# Patient Record
Sex: FEMALE | Race: WHITE | NOT HISPANIC OR LATINO | Employment: PART TIME | URBAN - METROPOLITAN AREA
[De-identification: names, ages, dates, MRNs, and addresses within clinical notes are randomized per-mention and may not be internally consistent; named-entity substitution may affect disease eponyms.]

---

## 2017-05-20 ENCOUNTER — ALLSCRIPTS OFFICE VISIT (OUTPATIENT)
Dept: OTHER | Facility: OTHER | Age: 22
End: 2017-05-20

## 2017-05-23 ENCOUNTER — GENERIC CONVERSION - ENCOUNTER (OUTPATIENT)
Dept: OTHER | Facility: OTHER | Age: 22
End: 2017-05-23

## 2017-05-23 LAB — CULTURE RESULT (HISTORICAL): NORMAL

## 2018-01-12 VITALS
BODY MASS INDEX: 20.14 KG/M2 | DIASTOLIC BLOOD PRESSURE: 60 MMHG | TEMPERATURE: 102 F | HEIGHT: 64 IN | SYSTOLIC BLOOD PRESSURE: 116 MMHG | WEIGHT: 118 LBS

## 2018-01-15 NOTE — RESULT NOTES
Verified Results  (Q) STREPTOCOCCUS, GROUP A CULTURE [id number] 12:00AM Pankaj Smith     Test Name Result Flag Reference   STREPTOCOCCUS, GROUP A$CULTURE      STREPTOCOCCUS, GROUP A CULTURE         MICRO NUMBER:      [id number]    TEST STATUS:       FINAL    SPECIMEN SOURCE:   THROAT    SPECIMEN QUALITY:  ADEQUATE    RESULT:            No group A Streptococcus isolated

## 2018-05-04 ENCOUNTER — OFFICE VISIT (OUTPATIENT)
Dept: FAMILY MEDICINE CLINIC | Facility: CLINIC | Age: 23
End: 2018-05-04
Payer: COMMERCIAL

## 2018-05-04 VITALS
SYSTOLIC BLOOD PRESSURE: 108 MMHG | TEMPERATURE: 98.3 F | BODY MASS INDEX: 20.83 KG/M2 | WEIGHT: 125 LBS | DIASTOLIC BLOOD PRESSURE: 60 MMHG | HEIGHT: 65 IN

## 2018-05-04 DIAGNOSIS — J31.0 RHINITIS, UNSPECIFIED TYPE: Primary | ICD-10-CM

## 2018-05-04 PROBLEM — E88.09 HYPOALBUMINEMIA: Status: ACTIVE | Noted: 2017-06-26

## 2018-05-04 PROBLEM — M32.14: Status: ACTIVE | Noted: 2017-05-20

## 2018-05-04 PROCEDURE — 99213 OFFICE O/P EST LOW 20 MIN: CPT | Performed by: FAMILY MEDICINE

## 2018-05-04 RX ORDER — FUROSEMIDE 20 MG/1
TABLET ORAL
COMMUNITY
End: 2019-05-01

## 2018-05-04 RX ORDER — LISINOPRIL 40 MG/1
TABLET ORAL
COMMUNITY
Start: 2018-03-04 | End: 2022-03-31 | Stop reason: SDUPTHER

## 2018-05-04 RX ORDER — MYCOPHENOLATE MOFETIL 500 MG/1
500 TABLET ORAL 4 TIMES DAILY
COMMUNITY
Start: 2018-04-23

## 2018-05-04 RX ORDER — PREDNISONE 10 MG/1
7.5 TABLET ORAL DAILY
COMMUNITY
End: 2019-07-26 | Stop reason: ALTCHOICE

## 2018-05-04 RX ORDER — LORATADINE 10 MG/1
10 TABLET ORAL DAILY
Qty: 30 TABLET | Refills: 0 | Status: SHIPPED | OUTPATIENT
Start: 2018-05-04 | End: 2019-07-26 | Stop reason: ALTCHOICE

## 2018-05-04 RX ORDER — HYDROXYCHLOROQUINE SULFATE 200 MG/1
TABLET, FILM COATED ORAL
COMMUNITY
Start: 2018-05-04 | End: 2022-03-31 | Stop reason: SDUPTHER

## 2018-05-04 RX ORDER — AMOXICILLIN 875 MG/1
875 TABLET, COATED ORAL 2 TIMES DAILY
Qty: 20 TABLET | Refills: 0 | Status: SHIPPED | OUTPATIENT
Start: 2018-05-04 | End: 2018-05-14

## 2018-05-04 RX ORDER — FLUTICASONE PROPIONATE 50 MCG
1 SPRAY, SUSPENSION (ML) NASAL DAILY
Qty: 16 G | Refills: 0 | Status: SHIPPED | OUTPATIENT
Start: 2018-05-04 | End: 2019-05-01

## 2018-05-04 NOTE — PROGRESS NOTES
Assessment/Plan:  Rhinitis  I believe the patient's otalgia is related to right-sided eustachian tube dysfunction and rhinitis  We are going to have her try fluticasone nasal spray as well as loratadine 10 milligrams  If her symptoms are not improving over the next 2-3 days or should she develop purulent nasal discharge, sputum or fever she will start Amoxil 875 b i d  She will push fluids and rest   She will call over the next 2-3 days if her symptoms are not improving or sooner if worse  She agrees  Diagnoses and all orders for this visit:    Rhinitis, unspecified type  -     fluticasone (FLONASE) 50 mcg/act nasal spray; 1 spray into each nostril daily  -     loratadine (CLARITIN) 10 mg tablet; Take 1 tablet (10 mg total) by mouth daily  -     amoxicillin (AMOXIL) 875 mg tablet; Take 1 tablet (875 mg total) by mouth 2 (two) times a day for 10 days    Other orders  -     furosemide (LASIX) 20 mg tablet; Take by mouth  -     hydroxychloroquine (PLAQUENIL) 200 mg tablet;   -     lisinopril (ZESTRIL) 40 mg tablet;   -     mycophenolate (CELLCEPT) 500 mg tablet;   -     predniSONE 10 mg tablet; Take by mouth daily          Subjective:   Chief Complaint   Patient presents with    Earache     right        Patient ID: Gwyn Ng is a 23 y o  female  My R ear hurts this week  No drainage mild diminished hearing  Nasal congestion  No discolored sputum or d/c  No wheeze, SOB or fever  Has been feeling well otherwise  HPI  The patient is a 21-year-old female who states for approximately 1 week she has had right otalgia which has been worse recently  She has had no otic drainage though she does feel like she has some minimal diminished hearing  She has had some nasal congestion and sneezing  She has had no purulent nasal discharge or sputum  She has had no wheezing, shortness of breath or fever  She has been feeling well otherwise    She does suffer from SLE and is immunocompromised by this in addition to the use of Plaquenil, CellCept and prednisone  She has had no nausea vomiting fever constitutional symptom  The following portions of the patient's history were reviewed and updated as appropriate: allergies, current medications, past family history, past medical history, past social history, past surgical history and problem list     Review of Systems   Constitution: Negative for fever and weight loss  HENT: Positive for congestion, ear pain and hearing loss  Negative for ear discharge and sore throat  Cardiovascular: Negative for chest pain  Respiratory: Negative for cough, hemoptysis, sputum production and wheezing  Hematologic/Lymphatic: Negative for adenopathy  Skin: Negative for rash  Gastrointestinal: Negative for diarrhea, nausea and vomiting  Neurological: Negative for headaches  Objective:    Physical Exam   Constitutional: She is oriented to person, place, and time  She appears well-developed and well-nourished  HENT:   Right Ear: External ear normal    Left Ear: External ear normal    Mouth/Throat: Oropharynx is clear and moist  No oropharyngeal exudate  She has cerumen in the canals bilaterally  Visualized portions of tympanic membranes appear normal   No facial bone tenderness  No increased Waldeyer's ring  No purulent postnasal drip  Nasal turbinates are boggy though not erythematous and without purulent discharge  Eyes: No scleral icterus  Mildly pale conjunctiva   Cardiovascular: Normal rate, regular rhythm and normal heart sounds  Pulmonary/Chest: Effort normal and breath sounds normal  No respiratory distress  She has no wheezes  She has no rales  Musculoskeletal: She exhibits no edema  Neurological: She is alert and oriented to person, place, and time  Skin: Skin is warm and dry  No rash noted  No erythema  Psychiatric: She has a normal mood and affect

## 2018-05-04 NOTE — ASSESSMENT & PLAN NOTE
I believe the patient's otalgia is related to right-sided eustachian tube dysfunction and rhinitis  We are going to have her try fluticasone nasal spray as well as loratadine 10 milligrams  If her symptoms are not improving over the next 2-3 days or should she develop purulent nasal discharge, sputum or fever she will start Amoxil 875 b i d  She will push fluids and rest   She will call over the next 2-3 days if her symptoms are not improving or sooner if worse  She agrees

## 2018-05-04 NOTE — PATIENT INSTRUCTIONS
Please use fluticasone and loratadine  If her symptoms are not improving over the next few days or should they began to worsen fill amoxicillin  Please call back with any concerns or failure of symptoms to resolve over the next several days

## 2018-05-21 ENCOUNTER — OFFICE VISIT (OUTPATIENT)
Dept: FAMILY MEDICINE CLINIC | Facility: CLINIC | Age: 23
End: 2018-05-21
Payer: COMMERCIAL

## 2018-05-21 VITALS
BODY MASS INDEX: 20.3 KG/M2 | SYSTOLIC BLOOD PRESSURE: 100 MMHG | WEIGHT: 122 LBS | TEMPERATURE: 97.4 F | DIASTOLIC BLOOD PRESSURE: 62 MMHG

## 2018-05-21 DIAGNOSIS — J02.9 PHARYNGITIS, UNSPECIFIED ETIOLOGY: Primary | ICD-10-CM

## 2018-05-21 PROBLEM — D84.9 IMMUNOSUPPRESSION (HCC): Status: ACTIVE | Noted: 2018-05-21

## 2018-05-21 PROCEDURE — 99213 OFFICE O/P EST LOW 20 MIN: CPT | Performed by: FAMILY MEDICINE

## 2018-05-21 RX ORDER — CEFUROXIME AXETIL 500 MG/1
500 TABLET ORAL EVERY 12 HOURS SCHEDULED
Qty: 20 TABLET | Refills: 0 | Status: SHIPPED | OUTPATIENT
Start: 2018-05-21 | End: 2018-05-31

## 2018-05-21 NOTE — PROGRESS NOTES
Assessment/Plan:  Pharyngitis  The patient is a 43-year-old immunocompromised female with severe SLE who recently developed pharyngitis with lymphadenitis while on amoxicillin  She did not have culture performed previously so we will obtain a throat culture today  Will start on Ceftin 500 b i d  for 10 days  She will push fluids and try to rest   She will gargle salt water as needed  She is asked to call in 3-4 days for report of her culture and for report of her condition  She will call sooner as needed  Diagnoses and all orders for this visit:    Pharyngitis, unspecified etiology  -     cefuroxime (CEFTIN) 500 mg tablet; Take 1 tablet (500 mg total) by mouth every 12 (twelve) hours for 10 days  -     Throat culture          Subjective:   Chief Complaint   Patient presents with    Swollen Glands    Nasal Congestion        Patient ID: Carson Gauthier is a 23 y o  female  I started the Amoxil but it really did not help  My nose is stuffy, my glands are swollen R > L and my throat hurts a lot  No fever  A dry cough began yesterday but now gone  Some sneezing and R sided otalgia  No N/V/D  No rash  Was still on Amoxil when the ST started  HPI  The patient is a 43-year-old female with severe SLE currently maintained on Plaquenil well as CellCept  She was seen approximately 2 weeks ago after returning from school with upper respiratory infection verses allergies  She was asked to start Flonase and loratadine but was given a prescription for amoxicillin if her symptoms did not improve  She did start amoxicillin after about 3 days and finished it several days ago  While taking the amoxicillin she developed recurrence of her sore throat, odynophagia and swollen glands which radiated to her ear  She has not been aware of any fever  She had minimal cough  She has had no rash  She has had some sneezing and right-sided otalgia  No nausea vomiting diarrhea    The following portions of the patient's history were reviewed and updated as appropriate: allergies, current medications, past medical history, past social history and problem list     ROS    See HPI for focused review of systems  Objective:    Physical Exam   Constitutional: She is oriented to person, place, and time  She appears well-developed and well-nourished  No distress  Somewhat fatigued-appearing   HENT:   Mouth/Throat: No oropharyngeal exudate  Intense or pharyngeal erythema, right greater than left  No exudate no tonsillar swelling and no petechiae  Eyes: Conjunctivae are normal  No scleral icterus  Neck: No thyromegaly present  She is tender anterior cervical adenopathy right greater than left  Cardiovascular: Normal rate, regular rhythm and normal heart sounds  No murmur heard  Pulmonary/Chest: Effort normal and breath sounds normal  No respiratory distress  She has no wheezes  She has no rales  Abdominal: Soft  Bowel sounds are normal  She exhibits no mass  There is no tenderness  Musculoskeletal: She exhibits no edema  Lymphadenopathy:     She has cervical adenopathy  Neurological: She is alert and oriented to person, place, and time  Skin: No rash noted  Psychiatric: She has a normal mood and affect   Thought content normal

## 2018-05-21 NOTE — ASSESSMENT & PLAN NOTE
The patient is a 24-year-old immunocompromised female with severe SLE who recently developed pharyngitis with lymphadenitis while on amoxicillin  She did not have culture performed previously so we will obtain a throat culture today  Will start on Ceftin 500 b i d  for 10 days  She will push fluids and try to rest   She will gargle salt water as needed  She is asked to call in 3-4 days for report of her culture and for report of her condition  She will call sooner as needed

## 2018-05-25 ENCOUNTER — OFFICE VISIT (OUTPATIENT)
Dept: FAMILY MEDICINE CLINIC | Facility: CLINIC | Age: 23
End: 2018-05-25
Payer: COMMERCIAL

## 2018-05-25 VITALS
SYSTOLIC BLOOD PRESSURE: 120 MMHG | OXYGEN SATURATION: 98 % | WEIGHT: 122 LBS | BODY MASS INDEX: 20.33 KG/M2 | HEIGHT: 65 IN | HEART RATE: 101 BPM | DIASTOLIC BLOOD PRESSURE: 80 MMHG | TEMPERATURE: 98.8 F

## 2018-05-25 DIAGNOSIS — M32.9 SYSTEMIC LUPUS ERYTHEMATOSUS, UNSPECIFIED SLE TYPE, UNSPECIFIED ORGAN INVOLVEMENT STATUS (HCC): ICD-10-CM

## 2018-05-25 DIAGNOSIS — D64.9 ANEMIA, UNSPECIFIED TYPE: ICD-10-CM

## 2018-05-25 DIAGNOSIS — J02.9 PHARYNGITIS, UNSPECIFIED ETIOLOGY: Primary | ICD-10-CM

## 2018-05-25 PROCEDURE — 36415 COLL VENOUS BLD VENIPUNCTURE: CPT | Performed by: FAMILY MEDICINE

## 2018-05-25 PROCEDURE — 99214 OFFICE O/P EST MOD 30 MIN: CPT | Performed by: FAMILY MEDICINE

## 2018-05-25 PROCEDURE — 3008F BODY MASS INDEX DOCD: CPT | Performed by: FAMILY MEDICINE

## 2018-05-25 NOTE — ASSESSMENT & PLAN NOTE
Continue with the current treatment for SLE with the exception of increasing her prednisone dose as we discussed

## 2018-05-25 NOTE — PROGRESS NOTES
Assessment/Plan:  Pharyngitis  The patient has a significant pharyngitis which has not improved significantly over the past 4 days  I do not see any evidence of peritonsillar abscess and her strep culture was negative  We are going to continue her on Ceftin for now will also have her increase her prednisone to 40 daily over the weekend  She has been taking 10  We asked her to push fluids and gargle with salt water  Also going to give her some Tylenol with codeine suspension for the pain  We are going to get a CBC, CMP, mono screen as well as Zainab Bar virus IgM and a C- reactive protein  Will discuss with the results with her when they are available  She is asked to call or seek more urgent medical attention should her condition worsen  She agrees  SLE (systemic lupus erythematosus) ([facility] )  Continue with the current treatment for SLE with the exception of increasing her prednisone dose as we discussed  Diagnoses and all orders for this visit:    Pharyngitis, unspecified etiology  -     acetaminophen-codeine (TYLENOL WITH CODEINE) 120-12 mg/5 mL suspension; Take 15 mL by mouth every 6 (six) hours as needed for moderate pain  -     CBC and differential  -     Comprehensive metabolic panel  -     Mononucleosis screen  -     Zainab-Barr virus VCA, IgM    Anemia, unspecified type  -     CBC and differential    Systemic lupus erythematosus, unspecified SLE type, unspecified organ involvement status ([facility] )  -     Comprehensive metabolic panel  -     C-reactive protein          Subjective:   Chief Complaint   Patient presents with    Sore Throat     pt states is burns alot and her glands are really swollen  she did have a fever yesterday but so far today her temp has stayed down  No history of mono and no exposure that she is aware of  Has fatigue  No cough  No other adenopathy other than anterior cervical  No rashes  Appetite poor  No N/V/D/ irritative urinary sx   Menses normal     Patient ID: Rebeca Taylor is a 23 y o  female  Dr Jaxon Toney, nephrology at [facility] managing condition  HPI  Patient is a 61-year-old female with a history of SLE including lupus nephritis as well as essential hypertension  She is also chronically immunosuppressed due to her treatment for lupus which includes CellCept, prednisone as well as Plaquenil  We did see her 4 days ago and diagnosed her with pharyngitis at that time  She was started on Ceftin 500 b i d  She has been compliant with the Ceftin  We also performed a throat culture at that time  Throat culture was negative  She continued with symptoms of severe odynophagia  She is feeling significantly fatigued  She denies any cough  She has had some swollen anterior cervical nodes but denies any inguinal axillary or other nodes that have been obvious to her  She states her appetite been down  She has been trying to push fluids and she has been using children's Tylenol to try to control her fever  She did have a fever yesterday but none today  She states that she has burning dysphagia  She has no nausea vomiting or diarrhea  She has no irritative urinary symptoms, no GI symptoms and her periods have been normal   She did see her nephrologist at [facility], Dr Jaxon Toney and he appeared to be pleased with her condition based on his note  She is unaware of any exposure to mononucleosis or other infectious Disease at this time  The following portions of the patient's history were reviewed and updated as appropriate: allergies, current medications, past medical history, past social history, past surgical history and problem list     Review of Systems   Constitution: Positive for chills, decreased appetite, fever and malaise/fatigue  Negative for night sweats and weight gain  HENT: Positive for sore throat  Negative for congestion and ear pain  Respiratory: Negative for cough, shortness of breath and wheezing      Hematologic/Lymphatic: Positive for adenopathy  Negative for bleeding problem  Skin: Negative for rash  Musculoskeletal: Negative for myalgias  Gastrointestinal: Negative  Genitourinary: Negative  Neurological: Negative for headaches  Objective:    Physical Exam   Constitutional: She is oriented to person, place, and time  She appears well-developed and well-nourished  No distress  Appears fatigued   HENT:   Nose: Nose normal    Mouth/Throat: No oropharyngeal exudate  Examination of the oral cavity and oropharynx reveals fairly severe oropharyngeal erythema involving some edema of the uvula as well as anterior tonsillar pillars  There is no shift of the uvula and there is no displacement to suggest tonsillar abscess  No other intraoral lesions noted  She was able to swallow a drink of water today  Eyes: Conjunctivae are normal  No scleral icterus  Neck: Neck supple  No JVD present  No thyromegaly present  Shotty nontender cervical adenopathy   Cardiovascular: Normal rate, regular rhythm and normal heart sounds  Exam reveals no gallop  No murmur heard  Pulmonary/Chest: Effort normal and breath sounds normal  No respiratory distress  She has no wheezes  She has no rales  Abdominal: Soft  Bowel sounds are normal  She exhibits no mass  There is no tenderness  No organomegaly   Musculoskeletal: She exhibits no edema  Lymphadenopathy:     She has cervical adenopathy  Neurological: She is alert and oriented to person, place, and time  Skin: Skin is warm  No rash noted     Psychiatric: Thought content normal

## 2018-05-25 NOTE — PATIENT INSTRUCTIONS
Continue with her Ceftin, push fluids  Increase prednisone to 40 mg daily  Use Tylenol with codeine for pain relief  Gargle salt water  Should she develop inability to swallow your secretions or other progressive worsening of your condition you should seek more urgent medical attention through the emergency room

## 2018-05-29 LAB
ALBUMIN SERPL-MCNC: 2.7 G/DL (ref 3.6–5.1)
ALBUMIN/GLOB SERPL: 1.3 (CALC) (ref 1–2.5)
ALP SERPL-CCNC: 200 U/L (ref 33–115)
ALT SERPL-CCNC: 60 U/L (ref 6–29)
AST SERPL-CCNC: 31 U/L (ref 10–30)
BASOPHILS # BLD AUTO: 0 CELLS/UL (ref 0–200)
BASOPHILS NFR BLD AUTO: 0 %
BILIRUB SERPL-MCNC: 0.2 MG/DL (ref 0.2–1.2)
BUN SERPL-MCNC: 11 MG/DL (ref 7–25)
BUN/CREAT SERPL: ABNORMAL (CALC) (ref 6–22)
CALCIUM SERPL-MCNC: 8.2 MG/DL (ref 8.6–10.2)
CHLORIDE SERPL-SCNC: 107 MMOL/L (ref 98–110)
CO2 SERPL-SCNC: 22 MMOL/L (ref 20–31)
CREAT SERPL-MCNC: 0.88 MG/DL (ref 0.5–1.1)
CRP SERPL-MCNC: 3.9 MG/L
EBV VCA IGM SER IA-ACNC: >160 U/ML
EOSINOPHIL # BLD AUTO: 0 CELLS/UL (ref 15–500)
EOSINOPHIL NFR BLD AUTO: 0 %
ERYTHROCYTE [DISTWIDTH] IN BLOOD BY AUTOMATED COUNT: 12.6 % (ref 11–15)
GLOBULIN SER CALC-MCNC: 2.1 G/DL (CALC) (ref 1.9–3.7)
GLUCOSE SERPL-MCNC: 89 MG/DL (ref 65–99)
HCT VFR BLD AUTO: 35.5 % (ref 35–45)
HETEROPH AB SER QL LA: NEGATIVE
HGB BLD-MCNC: 12.1 G/DL (ref 11.7–15.5)
LYMPHOCYTES # BLD MANUAL: 6604 CELLS/UL (ref 850–3900)
LYMPHOCYTES NFR BLD AUTO: 52 %
MCH RBC QN AUTO: 29.9 PG (ref 27–33)
MCHC RBC AUTO-ENTMCNC: 34.1 G/DL (ref 32–36)
MCV RBC AUTO: 87.7 FL (ref 80–100)
MONOCYTES # BLD AUTO: 889 CELLS/UL (ref 200–950)
MONOCYTES NFR BLD AUTO: 7 %
NEUTROPHILS # BLD AUTO: 4826 CELLS/UL (ref 1500–7800)
NEUTROPHILS NFR BLD AUTO: 38 %
NEUTS BAND # BLD: 381 CELLS/UL (ref 0–750)
NEUTS BAND NFR BLD MANUAL: 3 %
PLATELET # BLD AUTO: 321 THOUSAND/UL (ref 140–400)
PMV BLD REES-ECKER: 9.9 FL (ref 7.5–12.5)
POTASSIUM SERPL-SCNC: 4.1 MMOL/L (ref 3.5–5.3)
PROT SERPL-MCNC: 4.8 G/DL (ref 6.1–8.1)
RBC # BLD AUTO: 4.05 MILLION/UL (ref 3.8–5.1)
SL AMB EGFR AFRICAN AMERICAN: 110 ML/MIN/1.73M2
SL AMB EGFR NON AFRICAN AMERICAN: 95 ML/MIN/1.73M2
SODIUM SERPL-SCNC: 138 MMOL/L (ref 135–146)
WBC # BLD AUTO: 12.7 THOUSAND/UL (ref 3.8–10.8)

## 2019-05-01 ENCOUNTER — OFFICE VISIT (OUTPATIENT)
Dept: FAMILY MEDICINE CLINIC | Facility: CLINIC | Age: 24
End: 2019-05-01
Payer: COMMERCIAL

## 2019-05-01 VITALS
DIASTOLIC BLOOD PRESSURE: 76 MMHG | BODY MASS INDEX: 18.8 KG/M2 | HEIGHT: 66 IN | WEIGHT: 117 LBS | SYSTOLIC BLOOD PRESSURE: 108 MMHG

## 2019-05-01 DIAGNOSIS — N63.0 BREAST LUMP OR MASS: Primary | ICD-10-CM

## 2019-05-01 PROBLEM — J02.9 PHARYNGITIS: Status: RESOLVED | Noted: 2018-05-21 | Resolved: 2019-05-01

## 2019-05-01 PROCEDURE — 3008F BODY MASS INDEX DOCD: CPT | Performed by: FAMILY MEDICINE

## 2019-05-01 PROCEDURE — 99214 OFFICE O/P EST MOD 30 MIN: CPT | Performed by: FAMILY MEDICINE

## 2019-05-01 PROCEDURE — 1036F TOBACCO NON-USER: CPT | Performed by: FAMILY MEDICINE

## 2019-05-01 RX ORDER — PREDNISONE 1 MG/1
7.5 TABLET ORAL DAILY
Refills: 0 | COMMUNITY
Start: 2019-03-05

## 2019-07-26 ENCOUNTER — OFFICE VISIT (OUTPATIENT)
Dept: FAMILY MEDICINE CLINIC | Facility: CLINIC | Age: 24
End: 2019-07-26
Payer: COMMERCIAL

## 2019-07-26 ENCOUNTER — HOSPITAL ENCOUNTER (OUTPATIENT)
Dept: RADIOLOGY | Facility: HOSPITAL | Age: 24
Discharge: HOME | End: 2019-07-26
Payer: COMMERCIAL

## 2019-07-26 VITALS
BODY MASS INDEX: 18.64 KG/M2 | DIASTOLIC BLOOD PRESSURE: 80 MMHG | OXYGEN SATURATION: 98 % | HEIGHT: 66 IN | SYSTOLIC BLOOD PRESSURE: 116 MMHG | HEART RATE: 102 BPM | TEMPERATURE: 100.4 F | WEIGHT: 116 LBS

## 2019-07-26 DIAGNOSIS — R50.9 FEBRILE ILLNESS, ACUTE: ICD-10-CM

## 2019-07-26 DIAGNOSIS — N63.0 BREAST LUMP OR MASS: ICD-10-CM

## 2019-07-26 DIAGNOSIS — R50.9 FEBRILE ILLNESS, ACUTE: Primary | ICD-10-CM

## 2019-07-26 PROCEDURE — 71046 X-RAY EXAM CHEST 2 VIEWS: CPT

## 2019-07-26 PROCEDURE — 99214 OFFICE O/P EST MOD 30 MIN: CPT | Performed by: FAMILY MEDICINE

## 2019-07-26 PROCEDURE — 3008F BODY MASS INDEX DOCD: CPT | Performed by: FAMILY MEDICINE

## 2019-07-26 PROCEDURE — 1036F TOBACCO NON-USER: CPT | Performed by: FAMILY MEDICINE

## 2019-07-26 RX ORDER — AZITHROMYCIN 250 MG/1
TABLET, FILM COATED ORAL
Qty: 6 TABLET | Refills: 0 | Status: SHIPPED | OUTPATIENT
Start: 2019-07-26 | End: 2019-07-30

## 2019-07-26 NOTE — ASSESSMENT & PLAN NOTE
The patient is a 51-year-old immunocompromised female who presents today with a low-grade fever and feeling of chest tightness  On examination she does have some minimal crackles posteriorly in the right middle fields  She has surgery pending on Tuesday  We are going to start her on azithromycin, have her push fluids and rest   Will also have her get a chest x-ray and follow up with her when results are available  She agrees

## 2019-07-26 NOTE — PROGRESS NOTES
Assessment/Plan:  Febrile illness, acute  The patient is a 24-year-old immunocompromised female who presents today with a low-grade fever and feeling of chest tightness  On examination she does have some minimal crackles posteriorly in the right middle fields  She has surgery pending on Tuesday  We are going to start her on azithromycin, have her push fluids and rest   Will also have her get a chest x-ray and follow up with her when results are available  She agrees  Breast lump or mass  She is scheduled for excisional biopsy on Tuesday  Diagnoses and all orders for this visit:    Febrile illness, acute  -     azithromycin (ZITHROMAX) 250 mg tablet; 2 stat and then 1 QD  -     XR chest pa & lateral; Future    Breast lump or mass          Subjective:   Chief Complaint   Patient presents with    Fatigue     fever and achey all over  Sunday I was achy Tuesday felt feverish  Fever to 101  Tightness in chest  No cough or wheeze  No otalgia or ST  No nasal congestion, PND  No rash  No N/V/D  No  sx  HA  Has excision breast mass scheduled to Tuesday  No one else ill around her  Kids at play  Early intervention  Patient ID: Debbi Faust is a 26 y o  female  HPI  The patient is a 24-year-old female with a history of systemic lupus erythematosus with associated lupus nephritis  She is immunocompromised due to her use of prednisone and CellCept in addition to Plaquenil  She states on Sunday she began feeling achy  By Tuesday she felt feverish and she had a tight feeling in her chest   She has had fever home as high as 101  She denies any coughing or wheezing  She has had no upper respiratory symptoms, otalgia or shortness of breath  She had no rash  She has had no nausea vomiting or diarrhea  She has had no  symptoms  She has had a headache  She is due to have excision of a breast mass on Tuesday    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 but has not worked there for at least 2 weeks and does not recall anyone being ill around her  No one home is ill  The following portions of the patient's history were reviewed and updated as appropriate: allergies, current medications, past medical history, past social history, past surgical history and problem list     Review of Systems   Constitution: Positive for chills, fever and malaise/fatigue  Negative for decreased appetite and night sweats  Cardiovascular: Positive for chest pain  Negative for irregular heartbeat, leg swelling, orthopnea, palpitations and paroxysmal nocturnal dyspnea  Respiratory: Negative for cough, hemoptysis, sputum production and wheezing  Endocrine: Negative  Hematologic/Lymphatic: Negative  Skin: Negative for rash  Musculoskeletal: Negative  Gastrointestinal: Negative for constipation, diarrhea, nausea and vomiting  Genitourinary: Negative for dysuria and frequency  Neurological: Positive for headaches  Negative for dizziness  Objective:    Physical Exam   Constitutional: She is oriented to person, place, and time  Somewhat pale and fatigued appearing young adult female   HENT:   Mouth/Throat: Oropharynx is clear and moist  No oropharyngeal exudate  Eyes: Pupils are equal, round, and reactive to light  Right eye exhibits no discharge  Left eye exhibits no discharge  Neck: Neck supple  No JVD present  No thyromegaly present  Cardiovascular: Normal rate, regular rhythm and normal heart sounds  Pulmonary/Chest: Effort normal and breath sounds normal  No respiratory distress  She has no wheezes  She has some minimal crackles in the right mid posterior lung fields   Lymphadenopathy:     She has no cervical adenopathy  Neurological: She is alert and oriented to person, place, and time  Skin: No rash noted  Psychiatric: She has a normal mood and affect  Thought content normal    Nursing note and vitals reviewed

## 2019-07-27 ENCOUNTER — TELEPHONE (OUTPATIENT)
Dept: OTHER | Facility: OTHER | Age: 24
End: 2019-07-27

## 2019-07-29 ENCOUNTER — TELEPHONE (OUTPATIENT)
Dept: OTHER | Facility: OTHER | Age: 24
End: 2019-07-29

## 2020-01-03 PROBLEM — R50.9 FEBRILE ILLNESS, ACUTE: Status: RESOLVED | Noted: 2019-07-26 | Resolved: 2020-01-03

## 2020-01-24 RX ORDER — TRAMADOL HYDROCHLORIDE 50 MG/1
50 TABLET ORAL EVERY 6 HOURS PRN
COMMUNITY
Start: 2019-07-29 | End: 2020-10-16 | Stop reason: ALTCHOICE

## 2020-01-25 ENCOUNTER — OFFICE VISIT (OUTPATIENT)
Dept: FAMILY MEDICINE CLINIC | Facility: CLINIC | Age: 25
End: 2020-01-25
Payer: COMMERCIAL

## 2020-01-25 VITALS
SYSTOLIC BLOOD PRESSURE: 120 MMHG | BODY MASS INDEX: 19.93 KG/M2 | DIASTOLIC BLOOD PRESSURE: 80 MMHG | TEMPERATURE: 98.2 F | HEIGHT: 66 IN | WEIGHT: 124 LBS

## 2020-01-25 DIAGNOSIS — D84.9 IMMUNOSUPPRESSION (HCC): ICD-10-CM

## 2020-01-25 DIAGNOSIS — M32.9 SYSTEMIC LUPUS ERYTHEMATOSUS, UNSPECIFIED SLE TYPE, UNSPECIFIED ORGAN INVOLVEMENT STATUS (HCC): ICD-10-CM

## 2020-01-25 DIAGNOSIS — J01.00 ACUTE MAXILLARY SINUSITIS, RECURRENCE NOT SPECIFIED: Primary | ICD-10-CM

## 2020-01-25 PROCEDURE — 3008F BODY MASS INDEX DOCD: CPT | Performed by: FAMILY MEDICINE

## 2020-01-25 PROCEDURE — 99214 OFFICE O/P EST MOD 30 MIN: CPT | Performed by: FAMILY MEDICINE

## 2020-01-25 PROCEDURE — 3074F SYST BP LT 130 MM HG: CPT | Performed by: FAMILY MEDICINE

## 2020-01-25 PROCEDURE — 3079F DIAST BP 80-89 MM HG: CPT | Performed by: FAMILY MEDICINE

## 2020-01-25 PROCEDURE — 1036F TOBACCO NON-USER: CPT | Performed by: FAMILY MEDICINE

## 2020-01-25 RX ORDER — AZITHROMYCIN 250 MG/1
TABLET, FILM COATED ORAL
Qty: 6 TABLET | Refills: 0 | Status: SHIPPED | OUTPATIENT
Start: 2020-01-25 | End: 2020-01-29

## 2020-01-25 NOTE — ASSESSMENT & PLAN NOTE
Patient has acute upper respiratory syndrome  This may represent an element of sinusitis  Based on her immunocompromised condition based on her SLE and treatment with prednisone, Plaquenil and CellCept we are going to treat her with azithromycin  She is asked push fluids rest use over-the-counter agents  She is asked to call in 5-7 days if her symptoms not resolved  She will call sooner seek more urgent medical attention if her condition worsens  She agrees

## 2020-01-25 NOTE — PROGRESS NOTES
Assessment/Plan:  Acute maxillary sinusitis  Patient has acute upper respiratory syndrome  This may represent an element of sinusitis  Based on her immunocompromised condition based on her SLE and treatment with prednisone, Plaquenil and CellCept we are going to treat her with azithromycin  She is asked push fluids rest use over-the-counter agents  She is asked to call in 5-7 days if her symptoms not resolved  She will call sooner seek more urgent medical attention if her condition worsens  She agrees  SLE (systemic lupus erythematosus) ([facility] )  Continue follow-up with Nephrology through [facility]  Immunosuppression ([facility] )  Related to her treatment of SLE    Breast lump or mass  Shown to be fibroadenoma    She needs to have influenza pneumococcal vaccinations completed when she is feeling improved from her current condition  Diagnoses and all orders for this visit:    Acute maxillary sinusitis, recurrence not specified  -     azithromycin (ZITHROMAX) 250 mg tablet; 2 stat and then 1 QD    Systemic lupus erythematosus, unspecified SLE type, unspecified organ involvement status ([facility] )    Immunosuppression ([facility] )    Other orders  -     traMADol (ULTRAM) 50 mg tablet; Take 50 mg by mouth every 6 (six) hours as needed          Subjective:   Chief Complaint   Patient presents with    Nasal Congestion     Pink eye seems to have resolved  Patient ID: Kaity Olivares is a 27 y o  female  I have a bad head cold  Thursday pink eye  Seemed to have resolved  Green nasal d/c  No cough or SOB  No fever  Myalgias and mild HA  +/- ST    HPI  The patient is a 79-year-old immunocompromised female due to SLE who presents today stating that she has had a bad head cold for several days  Thursday she noted some redness of her eyes with some drainage  That did seem to resolve by yesterday  She has had some thick green nasal discharge  Some postnasal drip  She has no chest pain or shortness of breath    She has minimal cough which is clearing postnasal drip  She has had no significant headache  She has had some mild aching as well as a mild headache  She has had no nausea vomiting or diarrhea  Minimal sore throat  No new rash  No  symptoms  No visual disturbance  The following portions of the patient's history were reviewed and updated as appropriate: allergies, current medications, past family history, past medical history, past social history, past surgical history and problem list     Review of Systems   Constitution: Negative for chills, decreased appetite, fever and malaise/fatigue  HENT: Positive for congestion and sore throat  Negative for ear pain and odynophagia  Respiratory: Positive for cough  Negative for shortness of breath, sputum production and wheezing  Endocrine: Negative for polydipsia, polyphagia and polyuria  Skin: Negative for rash  Musculoskeletal: Positive for myalgias  Gastrointestinal: Negative for constipation, diarrhea, nausea and vomiting  Genitourinary: Negative for dysuria, flank pain, hesitancy and urgency  Neurological: Negative for headaches  Objective:    Physical Exam   Constitutional: She is oriented to person, place, and time  She appears well-developed and well-nourished  No distress  HENT:   Mouth/Throat: Oropharynx is clear and moist  No oropharyngeal exudate  She is boggy and erythematous nasal turbinates with thick yellow nasal discharge  She does have some paranasal sinus tenderness  Eyes: Conjunctivae are normal  Right eye exhibits no discharge  Left eye exhibits no discharge  She has no conjunctival injection or discharge  Neck:   No preauricular adenopathy  Cardiovascular: Normal rate, regular rhythm and normal heart sounds  Pulmonary/Chest: Effort normal and breath sounds normal  No respiratory distress  She has no wheezes  She has no rales  Abdominal: Soft  Bowel sounds are normal  She exhibits no distension   There is no tenderness  There is no rebound  Musculoskeletal: She exhibits no edema  Lymphadenopathy:     She has no cervical adenopathy  Neurological: She is alert and oriented to person, place, and time  Skin: No rash noted  No erythema  Psychiatric: She has a normal mood and affect  Thought content normal    Nursing note and vitals reviewed

## 2020-03-06 ENCOUNTER — CLINICAL SUPPORT (OUTPATIENT)
Dept: FAMILY MEDICINE CLINIC | Facility: CLINIC | Age: 25
End: 2020-03-06
Payer: COMMERCIAL

## 2020-03-06 DIAGNOSIS — M32.9 SYSTEMIC LUPUS ERYTHEMATOSUS, UNSPECIFIED SLE TYPE, UNSPECIFIED ORGAN INVOLVEMENT STATUS (HCC): Primary | ICD-10-CM

## 2020-03-06 DIAGNOSIS — Z23 ENCOUNTER FOR IMMUNIZATION: ICD-10-CM

## 2020-03-06 PROCEDURE — 90471 IMMUNIZATION ADMIN: CPT

## 2020-03-06 PROCEDURE — 90686 IIV4 VACC NO PRSV 0.5 ML IM: CPT

## 2020-03-06 PROCEDURE — 90670 PCV13 VACCINE IM: CPT

## 2020-03-06 PROCEDURE — 90472 IMMUNIZATION ADMIN EACH ADD: CPT

## 2020-06-30 ENCOUNTER — CLINICAL SUPPORT (OUTPATIENT)
Dept: FAMILY MEDICINE CLINIC | Facility: CLINIC | Age: 25
End: 2020-06-30
Payer: COMMERCIAL

## 2020-06-30 DIAGNOSIS — Z23 ENCOUNTER FOR IMMUNIZATION: Primary | ICD-10-CM

## 2020-06-30 PROCEDURE — 86580 TB INTRADERMAL TEST: CPT

## 2020-06-30 PROCEDURE — 90732 PPSV23 VACC 2 YRS+ SUBQ/IM: CPT

## 2020-06-30 PROCEDURE — 90471 IMMUNIZATION ADMIN: CPT

## 2020-06-30 PROCEDURE — 90715 TDAP VACCINE 7 YRS/> IM: CPT

## 2020-06-30 PROCEDURE — 90472 IMMUNIZATION ADMIN EACH ADD: CPT

## 2020-07-02 ENCOUNTER — CLINICAL SUPPORT (OUTPATIENT)
Dept: FAMILY MEDICINE CLINIC | Facility: CLINIC | Age: 25
End: 2020-07-02
Payer: COMMERCIAL

## 2020-07-02 DIAGNOSIS — Z11.59 NEED FOR HEPATITIS B SCREENING TEST: Primary | ICD-10-CM

## 2020-07-02 LAB
INDURATION: 0 MM
TB SKIN TEST: NEGATIVE

## 2020-07-02 PROCEDURE — 36415 COLL VENOUS BLD VENIPUNCTURE: CPT

## 2020-07-06 LAB — HBV SURFACE AB SERPL IA-ACNC: <5 MIU/ML

## 2020-07-07 ENCOUNTER — CLINICAL SUPPORT (OUTPATIENT)
Dept: FAMILY MEDICINE CLINIC | Facility: CLINIC | Age: 25
End: 2020-07-07
Payer: COMMERCIAL

## 2020-07-07 DIAGNOSIS — Z11.1 SCREENING FOR TUBERCULOSIS: Primary | ICD-10-CM

## 2020-07-07 PROCEDURE — 86580 TB INTRADERMAL TEST: CPT

## 2020-07-09 ENCOUNTER — CLINICAL SUPPORT (OUTPATIENT)
Dept: FAMILY MEDICINE CLINIC | Facility: CLINIC | Age: 25
End: 2020-07-09

## 2020-07-09 DIAGNOSIS — Z11.1 SCREENING FOR TUBERCULOSIS: Primary | ICD-10-CM

## 2020-07-09 LAB
INDURATION: 0 MM
TB SKIN TEST: NEGATIVE

## 2020-10-16 ENCOUNTER — TELEMEDICINE (OUTPATIENT)
Dept: FAMILY MEDICINE CLINIC | Facility: CLINIC | Age: 25
End: 2020-10-16

## 2020-10-16 DIAGNOSIS — F32.2 CURRENT SEVERE EPISODE OF MAJOR DEPRESSIVE DISORDER WITHOUT PSYCHOTIC FEATURES WITHOUT PRIOR EPISODE (HCC): ICD-10-CM

## 2020-10-16 DIAGNOSIS — F32.2 CURRENT SEVERE EPISODE OF MAJOR DEPRESSIVE DISORDER WITHOUT PSYCHOTIC FEATURES WITHOUT PRIOR EPISODE (HCC): Primary | ICD-10-CM

## 2020-10-16 RX ORDER — FLUOXETINE 10 MG/1
10 CAPSULE ORAL DAILY
Qty: 30 CAPSULE | Refills: 0 | Status: SHIPPED | OUTPATIENT
Start: 2020-10-16 | End: 2020-11-13 | Stop reason: SDUPTHER

## 2020-10-16 RX ORDER — CYCLOSPORINE 100 MG/1
100 CAPSULE, LIQUID FILLED ORAL EVERY 12 HOURS
COMMUNITY
Start: 2020-10-08

## 2020-10-16 RX ORDER — FLUOXETINE 10 MG/1
10 CAPSULE ORAL DAILY
Qty: 30 CAPSULE | Refills: 0
Start: 2020-10-16 | End: 2020-10-16 | Stop reason: SDUPTHER

## 2020-11-13 ENCOUNTER — TELEMEDICINE (OUTPATIENT)
Dept: FAMILY MEDICINE CLINIC | Facility: CLINIC | Age: 25
End: 2020-11-13

## 2020-11-13 VITALS — BODY MASS INDEX: 19.29 KG/M2 | WEIGHT: 120 LBS | HEIGHT: 66 IN

## 2020-11-13 DIAGNOSIS — F32.2 CURRENT SEVERE EPISODE OF MAJOR DEPRESSIVE DISORDER WITHOUT PSYCHOTIC FEATURES WITHOUT PRIOR EPISODE (HCC): ICD-10-CM

## 2020-11-13 RX ORDER — FLUOXETINE HYDROCHLORIDE 20 MG/1
20 CAPSULE ORAL DAILY
Qty: 30 CAPSULE | Refills: 2 | Status: SHIPPED | OUTPATIENT
Start: 2020-11-13 | End: 2021-02-03 | Stop reason: SDUPTHER

## 2020-11-13 RX ORDER — AMOXICILLIN 875 MG/1
TABLET, COATED ORAL
COMMUNITY
Start: 2020-11-11 | End: 2021-02-03 | Stop reason: ALTCHOICE

## 2020-11-13 RX ORDER — CYCLOSPORINE 50 MG/1
CAPSULE, LIQUID FILLED ORAL 2 TIMES DAILY
COMMUNITY
Start: 2020-10-29

## 2021-02-03 ENCOUNTER — TELEMEDICINE (OUTPATIENT)
Dept: FAMILY MEDICINE CLINIC | Facility: CLINIC | Age: 26
End: 2021-02-03
Payer: COMMERCIAL

## 2021-02-03 VITALS — HEIGHT: 66 IN | BODY MASS INDEX: 18.8 KG/M2 | WEIGHT: 117 LBS

## 2021-02-03 DIAGNOSIS — F32.2 CURRENT SEVERE EPISODE OF MAJOR DEPRESSIVE DISORDER WITHOUT PSYCHOTIC FEATURES WITHOUT PRIOR EPISODE (HCC): ICD-10-CM

## 2021-02-03 PROCEDURE — 3008F BODY MASS INDEX DOCD: CPT | Performed by: FAMILY MEDICINE

## 2021-02-03 PROCEDURE — 1036F TOBACCO NON-USER: CPT | Performed by: FAMILY MEDICINE

## 2021-02-03 PROCEDURE — 99213 OFFICE O/P EST LOW 20 MIN: CPT | Performed by: FAMILY MEDICINE

## 2021-02-03 RX ORDER — FLUOXETINE HYDROCHLORIDE 20 MG/1
20 CAPSULE ORAL DAILY
Qty: 90 CAPSULE | Refills: 1 | Status: SHIPPED | OUTPATIENT
Start: 2021-02-03 | End: 2021-06-14

## 2021-02-03 NOTE — ASSESSMENT & PLAN NOTE
The patient is seen in follow-up for major depressive disorder  She is compliant with her fluoxetine 20 which she continues to feels helpful at alleviating the majority of the vegetative symptoms of depression  She does continue to have some suicidal thoughts but no plan  She will begin with her school psychologist next week  We are going to have her continue the fluoxetine 20 and will see her back in 6 months virtually  She is asked call sooner or seek more urgent medical attention should she see any significant deterioration in her condition, especially any  Increased suicidal thinking or plan  She agrees with this

## 2021-02-03 NOTE — PROGRESS NOTES
Virtual Regular Visit      Assessment/Plan:    Problem List Items Addressed This Visit        Other    Current severe episode of major depressive disorder without psychotic features without prior episode [facility])       The patient is seen in follow-up for major depressive disorder  She is compliant with her fluoxetine 20 which she continues to feels helpful at alleviating the majority of the vegetative symptoms of depression  She does continue to have some suicidal thoughts but no plan  She will begin with her school psychologist next week  We are going to have her continue the fluoxetine 20 and will see her back in 6 months virtually  She is asked call sooner or seek more urgent medical attention should she see any significant deterioration in her condition, especially any  Increased suicidal thinking or plan  She agrees with this  Relevant Medications    FLUoxetine (PROzac) 20 mg capsule               Reason for visit is   Chief Complaint   Patient presents with    Follow-up     Med check prozac    Virtual Regular Visit        Encounter provider Deitra Mortimer, MD    Provider located at [address]  [address] [facility] [address]      Recent Visits  No visits were found meeting these conditions  Showing recent visits within past 7 days and meeting all other requirements     Today's Visits  Date Type Provider Dept   02/03/21 Telemedicine Deitra Mortimer, MD [facility]   Showing today's visits and meeting all other requirements     Future Appointments  No visits were found meeting these conditions  Showing future appointments within next 150 days and meeting all other requirements        The patient was identified by name and date of birth  Shannon Records was informed that this is a telemedicine visit and that the visit is being conducted through [facility] and patient was informed that this is a secure, HIPAA-compliant platform  She agrees to proceed  Hadley Matos My office door was closed  No one else was in the room  She acknowledged consent and understanding of privacy and security of the video platform  The patient has agreed to participate and understands they can discontinue the visit at any time  Patient is aware this is a billable service  Subjective  Paresh Lees is a 23 y o  female who presents today virtually for routine follow-up of depression  I am going to go to school therapist  Medication helpful  Sleep is still a struggle  Cannot fall asleep or stay asleep  Interest level good, energy fair, concentration good, 4 0 GPA  Appetite down, suicidal thoughts still there  No plan  HPI    the patient is a 53-year-old female who presents today virtually for routine follow-up of major depressive disorder  She has been compliant with her fluoxetine  She continues to feel that it is helpful for her condition  She does continue to have difficulty with sleep as far as falling sleep as well as staying asleep  She states her interest level is good and 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  Her energy level is fair and her concentration is good  She did get a 4 00 last semester  She does note that her appetite is somewhat down and we noted her weight is down about 3 lb since November  She continues to have some suicidal thoughts at times but she has no plan  She has good relationship with a roommate and she has also made some other friends on campus and feels that from that standpoint things are going well  She denies any side effects from the fluoxetine  She did do intake with the school psychology department and has 1st appointment with her psychologist next week  Past Medical History:   Diagnosis Date    Lupus ([address] )        No past surgical history on file      Current Outpatient Medications   Medication Sig Dispense Refill    cycloSPORINE modified (NEORAL) 100 mg capsule Take 100 mg by mouth every 12 (twelve) hours      cycloSPORINE modified (NEORAL) 50 MG capsule       FLUoxetine (PROzac) 20 mg capsule Take 1 capsule (20 mg total) by mouth daily 90 capsule 1    hydroxychloroquine (PLAQUENIL) 200 mg tablet       lisinopril (ZESTRIL) 40 mg tablet       mycophenolate (CELLCEPT) 500 mg tablet 500 mg 3 (three) times a day       predniSONE 5 mg tablet Take 7 5 mg by mouth daily  0     No current facility-administered medications for this visit  No Known Allergies    Review of Systems   Psychiatric/Behavioral:        As noted in the HPI   All other systems reviewed and are negative  Video Exam    Vitals:    02/03/21 0958   Weight: 53 1 kg (117 lb)   Height: 5' 5 5" (1 664 m)       Physical Exam  Constitutional:       Appearance: Normal appearance  Pulmonary:      Effort: Pulmonary effort is normal  No respiratory distress  Neurological:      Mental Status: She is alert and oriented to person, place, and time  Psychiatric:         Thought Content: Thought content normal          Judgment: Judgment normal       Comments: Depressed affect which is unchanged from previous          I spent 15 minutes directly with the patient during this visit      [address] acknowledges that she has consented to an online visit or consultation  She understands that the online visit is based solely on information provided by her, and that, in the absence of a face-to-face physical evaluation by the physician, the diagnosis she receives is both limited and provisional in terms of accuracy and completeness  This is not intended to replace a full medical face-to-face evaluation by the physician  Dieter Sharpe understands and accepts these terms

## 2021-06-14 ENCOUNTER — TELEMEDICINE (OUTPATIENT)
Dept: FAMILY MEDICINE CLINIC | Facility: CLINIC | Age: 26
End: 2021-06-14
Payer: COMMERCIAL

## 2021-06-14 VITALS — HEIGHT: 66 IN | WEIGHT: 120 LBS | BODY MASS INDEX: 19.29 KG/M2

## 2021-06-14 DIAGNOSIS — F32.2 CURRENT SEVERE EPISODE OF MAJOR DEPRESSIVE DISORDER WITHOUT PSYCHOTIC FEATURES WITHOUT PRIOR EPISODE (HCC): Primary | ICD-10-CM

## 2021-06-14 PROBLEM — J01.00 ACUTE MAXILLARY SINUSITIS: Status: RESOLVED | Noted: 2020-01-25 | Resolved: 2021-06-14

## 2021-06-14 PROCEDURE — 99214 OFFICE O/P EST MOD 30 MIN: CPT | Performed by: FAMILY MEDICINE

## 2021-06-14 PROCEDURE — 1036F TOBACCO NON-USER: CPT | Performed by: FAMILY MEDICINE

## 2021-06-14 PROCEDURE — 3008F BODY MASS INDEX DOCD: CPT | Performed by: FAMILY MEDICINE

## 2021-06-14 RX ORDER — DULOXETIN HYDROCHLORIDE 30 MG/1
30 CAPSULE, DELAYED RELEASE ORAL DAILY
Qty: 30 CAPSULE | Refills: 0 | Status: SHIPPED | OUTPATIENT
Start: 2021-06-14 | End: 2021-07-02 | Stop reason: DRUGHIGH

## 2021-06-14 NOTE — PROGRESS NOTES
Virtual Regular Visit      Assessment/Plan:    Problem List Items Addressed This Visit        Other    Current severe episode of major depressive disorder without psychotic features without prior episode ([facility] [address] ) - Primary     The patient is a 89-year-old female with major depressive disorder in addition to her severe SLE  She does not feel fluoxetine has continued to be as effective as it had been previously  I suggested based on her current review that an agent such as duloxetine may be more effective  She is in favor of a change/alternative agent  We are going to start her on duloxetine 30 mg daily  Will see her back in about 2 weeks to see how she is responding  She is asked continue following with her local therapist   She is also asked call or seek more urgent medical attention should she have any progression of her suicidal ideation or other progression of her condition  She agrees with this plan  Relevant Medications    DULoxetine (CYMBALTA) 30 mg delayed release capsule               Reason for visit is   Chief Complaint   Patient presents with    Depression     pt having a med check    Virtual Regular Visit        Encounter provider Rere Rhodes MD    Provider located at [address] [address]      Recent Visits  No visits were found meeting these conditions  Showing recent visits within past 7 days and meeting all other requirements  Today's Visits  Date Type Provider Dept   06/14/21 Telemedicine Rere Rhodes MD [facility]   Showing today's visits and meeting all other requirements  Future Appointments  No visits were found meeting these conditions  Showing future appointments within next 150 days and meeting all other requirements       The patient was identified by name and date of birth   Fidelia Vega was informed that this is a telemedicine visit and that the visit is being conducted through [address] [place] [address] and patient was informed that this is a secure, HIPAA-compliant platform  She agrees to proceed     My office door was closed  No one else was in the room  She acknowledged consent and understanding of privacy and security of the video platform  The patient has agreed to participate and understands they can discontinue the visit at any time  Patient is aware this is a billable service  Subjective  Jelly Elder is a 23 y o  female who presents virtually today for routine follow-up of depression  I dont feel that the medication is as effective  Sleep poor, interest low, energy so so, concentration ok, appetite so so, suicidal thoughts but no intent  Has a therapist once a week for 50 minutes  On campus,        HPI     The patient is a 79-year-old female with a history of severe SLE followed by Rheumatology and on multiple immunosuppressant agents who also suffers from major depressive disorder  Today she states that she does not feel the medication is as effective as it had been previously  She states that she has disrupted sleep, lowered interest and her energy level is so-so  She can concentrate okay  Her appetite is so-so  She continues to have suicidal thoughts and has a plan but presently has no intent  She continues to follow-up with her therapist once a week for 50 minutes on campus  Past Medical History:   Diagnosis Date    Lupus ([facility] [address] )        No past surgical history on file      Current Outpatient Medications   Medication Sig Dispense Refill    cycloSPORINE modified (NEORAL) 100 mg capsule Take 100 mg by mouth every 12 (twelve) hours      cycloSPORINE modified (NEORAL) 50 MG capsule 2 (two) times a day       hydroxychloroquine (PLAQUENIL) 200 mg tablet       lisinopril (ZESTRIL) 40 mg tablet       mycophenolate (CELLCEPT) 500 mg tablet 500 mg 4 (four) times a day       predniSONE 5 mg tablet Take 7 5 mg by mouth daily  0    DULoxetine (CYMBALTA) 30 mg delayed release capsule Take 1 capsule (30 mg total) by mouth daily 30 capsule 0     No current facility-administered medications for this visit  No Known Allergies    Review of Systems    Video Exam    Vitals:    06/14/21 1333   Weight: 54 4 kg (120 lb)   Height: 5' 5 5" (1 664 m)       Physical Exam  Pulmonary:      Effort: Pulmonary effort is normal  No respiratory distress  Neurological:      Mental Status: She is alert and oriented to person, place, and time  Psychiatric:         Thought Content: Thought content normal          Judgment: Judgment normal       Comments: Dysphoric affect          I spent 25 minutes directly with the patient during this visit      [address] acknowledges that she has consented to an online visit or consultation  She understands that the online visit is based solely on information provided by her, and that, in the absence of a face-to-face physical evaluation by the physician, the diagnosis she receives is both limited and provisional in terms of accuracy and completeness  This is not intended to replace a full medical face-to-face evaluation by the physician  Edin Fung understands and accepts these terms

## 2021-06-14 NOTE — ASSESSMENT & PLAN NOTE
The patient is a 82-year-old female with major depressive disorder in addition to her severe SLE  She does not feel fluoxetine has continued to be as effective as it had been previously  I suggested based on her current review that an agent such as duloxetine may be more effective  She is in favor of a change/alternative agent  We are going to start her on duloxetine 30 mg daily  Will see her back in about 2 weeks to see how she is responding  She is asked continue following with her local therapist   She is also asked call or seek more urgent medical attention should she have any progression of her suicidal ideation or other progression of her condition  She agrees with this plan

## 2021-07-02 ENCOUNTER — TELEMEDICINE (OUTPATIENT)
Dept: FAMILY MEDICINE CLINIC | Facility: CLINIC | Age: 26
End: 2021-07-02
Payer: COMMERCIAL

## 2021-07-02 VITALS — HEIGHT: 66 IN | BODY MASS INDEX: 19.29 KG/M2 | WEIGHT: 120 LBS

## 2021-07-02 DIAGNOSIS — F32.2 CURRENT SEVERE EPISODE OF MAJOR DEPRESSIVE DISORDER WITHOUT PSYCHOTIC FEATURES WITHOUT PRIOR EPISODE (HCC): Primary | ICD-10-CM

## 2021-07-02 PROCEDURE — 99213 OFFICE O/P EST LOW 20 MIN: CPT | Performed by: FAMILY MEDICINE

## 2021-07-02 PROCEDURE — 1036F TOBACCO NON-USER: CPT | Performed by: FAMILY MEDICINE

## 2021-07-02 PROCEDURE — 3008F BODY MASS INDEX DOCD: CPT | Performed by: FAMILY MEDICINE

## 2021-07-02 RX ORDER — DULOXETIN HYDROCHLORIDE 60 MG/1
60 CAPSULE, DELAYED RELEASE ORAL DAILY
Qty: 30 CAPSULE | Refills: 0 | Status: SHIPPED | OUTPATIENT
Start: 2021-07-02

## 2021-07-02 NOTE — PROGRESS NOTES
Virtual Regular Visit      Assessment/Plan:    Problem List Items Addressed This Visit        Other    Current severe episode of major depressive disorder without psychotic features without prior episode ([facility] [address] ) - Primary     The patient is compliant with her Cymbalta 30  She has noticed increased fatigue though she has had some improvement in concentration  We are going to have her increase her Cymbalta to 60 mg  Will see her back virtually in 3-4 weeks  She is asked call back sooner if she has any significant deterioration in her condition  She will call or seek more urgent medical attention if she has any progression of her suicidal thinking  She presently has no plan  She agrees with this plan  Relevant Medications    DULoxetine (CYMBALTA) 60 mg delayed release capsule               Reason for visit is   Chief Complaint   Patient presents with    Follow-up     2 week recheck  Feels 50-50  More fatigued   Virtual Regular Visit        Encounter provider Emilie Yeboah MD    Provider located at [address] [address]      Recent Visits  No visits were found meeting these conditions  Showing recent visits within past 7 days and meeting all other requirements  Today's Visits  Date Type Provider Dept   07/02/21 Telemedicine Emilie Yeboah MD [facility]   Showing today's visits and meeting all other requirements  Future Appointments  No visits were found meeting these conditions  Showing future appointments within next 150 days and meeting all other requirements       The patient was identified by name and date of birth  Juju Joshi was informed that this is a telemedicine visit and that the visit is being conducted through [address] [place] [address] Now and patient was informed that this is a secure, HIPAA-compliant platform  She agrees to proceed     My office door was closed  No one else was in the room    She acknowledged consent and understanding of privacy and security of the video platform  The patient has agreed to participate and understands they can discontinue the visit at any time  Patient is aware this is a billable service  Subjective  Michelle Cruz is a 23 y o  female for follow up of depression and recent change of medication  Feeling really tired  Has a bit more SLE flare  Interest is the same, energy less, concentration improved, appetite no change, Suicidal thoughts, no intent  HPI   The patient is a 44-year-old female who presents today for follow-up of major depressive disorder  We recently changed her from fluoxetine to duloxetine  She states that she feels more tired but this may be related to a lupus flare  She has had her medication adjusted by her rheumatologist   She states that her interest level is the same but she has less energy  She feels her concentration is improved and her appetite is unchanged  She continues to have some suicidal thoughts which are unchanged but she has no intent  She continues to speak with her counselor  She has had some issues with a friend in her program which has caused increased stress  Past Medical History:   Diagnosis Date    Lupus ([facility] [address] )        No past surgical history on file  Current Outpatient Medications   Medication Sig Dispense Refill    cycloSPORINE modified (NEORAL) 100 mg capsule Take 100 mg by mouth every 12 (twelve) hours      cycloSPORINE modified (NEORAL) 50 MG capsule 2 (two) times a day       DULoxetine (CYMBALTA) 60 mg delayed release capsule Take 1 capsule (60 mg total) by mouth daily 30 capsule 0    hydroxychloroquine (PLAQUENIL) 200 mg tablet       lisinopril (ZESTRIL) 40 mg tablet       mycophenolate (CELLCEPT) 500 mg tablet 500 mg 4 (four) times a day       predniSONE 5 mg tablet Take 7 5 mg by mouth daily  0     No current facility-administered medications for this visit          No Known Allergies    Review of Systems   Constitutional: Positive for fatigue  Psychiatric/Behavioral: Positive for dysphoric mood  Negative for sleep disturbance  The patient is not nervous/anxious  All other systems reviewed and are negative  Video Exam    Vitals:    07/02/21 1253   Weight: 54 4 kg (120 lb)   Height: 5' 5 5" (1 664 m)       Physical Exam  Vitals reviewed  Constitutional:       Appearance: Normal appearance  Pulmonary:      Effort: Pulmonary effort is normal       Breath sounds: Normal breath sounds  Neurological:      Mental Status: She is alert and oriented to person, place, and time  Psychiatric:         Thought Content: Thought content normal          Judgment: Judgment normal       Comments: Appears depressed which is her baseline and unchanged          I spent 20 minutes directly with the patient during this visit      VIRTUAL VISIT [facility] acknowledges that she has consented to an online visit or consultation  She understands that the online visit is based solely on information provided by her, and that, in the absence of a face-to-face physical evaluation by the physician, the diagnosis she receives is both limited and provisional in terms of accuracy and completeness  This is not intended to replace a full medical face-to-face evaluation by the physician  Naomi Chicas understands and accepts these terms

## 2021-07-02 NOTE — ASSESSMENT & PLAN NOTE
The patient is compliant with her Cymbalta 30  She has noticed increased fatigue though she has had some improvement in concentration  We are going to have her increase her Cymbalta to 60 mg  Will see her back virtually in 3-4 weeks  She is asked call back sooner if she has any significant deterioration in her condition  She will call or seek more urgent medical attention if she has any progression of her suicidal thinking  She presently has no plan  She agrees with this plan

## 2022-03-31 DIAGNOSIS — M32.14 LUPUS NEPHRITIS (HCC): ICD-10-CM

## 2022-03-31 DIAGNOSIS — I10 ESSENTIAL HYPERTENSION: Primary | ICD-10-CM

## 2022-03-31 RX ORDER — HYDROXYCHLOROQUINE SULFATE 200 MG/1
200 TABLET, FILM COATED ORAL DAILY
Qty: 90 TABLET | Refills: 0 | Status: SHIPPED | OUTPATIENT
Start: 2022-03-31 | End: 2022-06-29

## 2022-03-31 RX ORDER — LISINOPRIL 40 MG/1
40 TABLET ORAL DAILY
Qty: 90 TABLET | Refills: 0 | Status: SHIPPED | OUTPATIENT
Start: 2022-03-31